# Patient Record
Sex: FEMALE | Race: WHITE | NOT HISPANIC OR LATINO | Employment: FULL TIME | URBAN - METROPOLITAN AREA
[De-identification: names, ages, dates, MRNs, and addresses within clinical notes are randomized per-mention and may not be internally consistent; named-entity substitution may affect disease eponyms.]

---

## 2023-12-27 ENCOUNTER — OFFICE VISIT (OUTPATIENT)
Dept: URGENT CARE | Facility: CLINIC | Age: 63
End: 2023-12-27

## 2023-12-27 VITALS
DIASTOLIC BLOOD PRESSURE: 91 MMHG | HEIGHT: 71 IN | WEIGHT: 180.75 LBS | OXYGEN SATURATION: 100 % | RESPIRATION RATE: 20 BRPM | HEART RATE: 78 BPM | BODY MASS INDEX: 25.31 KG/M2 | TEMPERATURE: 99 F | SYSTOLIC BLOOD PRESSURE: 161 MMHG

## 2023-12-27 DIAGNOSIS — I10 HYPERTENSION, UNSPECIFIED TYPE: ICD-10-CM

## 2023-12-27 DIAGNOSIS — N30.90 CYSTITIS: ICD-10-CM

## 2023-12-27 DIAGNOSIS — R39.15 URGENCY OF URINATION: Primary | ICD-10-CM

## 2023-12-27 LAB
BILIRUB UR QL STRIP: NEGATIVE
GLUCOSE UR QL STRIP: NEGATIVE
KETONES UR QL STRIP: NEGATIVE
LEUKOCYTE ESTERASE UR QL STRIP: NEGATIVE
PH, POC UA: 7 (ref 5–8)
POC BLOOD, URINE: POSITIVE
POC NITRATES, URINE: NEGATIVE
PROT UR QL STRIP: POSITIVE
SP GR UR STRIP: 1.01 (ref 1–1.03)
UROBILINOGEN UR STRIP-ACNC: NORMAL (ref 0.1–1.1)

## 2023-12-27 PROCEDURE — 81003 URINALYSIS AUTO W/O SCOPE: CPT | Mod: QW,S$GLB,, | Performed by: INTERNAL MEDICINE

## 2023-12-27 PROCEDURE — 99204 OFFICE O/P NEW MOD 45 MIN: CPT | Mod: S$GLB,,, | Performed by: INTERNAL MEDICINE

## 2023-12-27 RX ORDER — ENALAPRIL MALEATE 5 MG/1
5 TABLET ORAL DAILY
Qty: 90 TABLET | Refills: 3 | Status: SHIPPED | OUTPATIENT
Start: 2023-12-27 | End: 2024-12-26

## 2023-12-27 RX ORDER — NITROFURANTOIN 25; 75 MG/1; MG/1
100 CAPSULE ORAL 2 TIMES DAILY
Qty: 10 CAPSULE | Refills: 0 | Status: SHIPPED | OUTPATIENT
Start: 2023-12-27 | End: 2024-01-01

## 2023-12-27 NOTE — PROGRESS NOTES
"Subjective:      Patient ID: Cortney Strickland is a 63 y.o. female.    Vitals:  height is 5' 10.87" (1.8 m) and weight is 82 kg (180 lb 12.4 oz). Her temperature is 98.6 °F (37 °C). Her blood pressure is 161/91 (abnormal) and her pulse is 78. Her respiration is 20 and oxygen saturation is 100%.     Chief Complaint: Urinary Frequency    Urinary Frequency   This is a new problem. The current episode started yesterday. The problem occurs every urination. The problem has been gradually worsening. The quality of the pain is described as burning. The pain is at a severity of 8/10. The pain is severe. There has been no fever. She is Sexually active. Associated symptoms include frequency, hematuria and urgency. Pertinent negatives include no behavior changes, chills, discharge, flank pain, hesitancy, nausea, possible pregnancy, sweats, vomiting, weight loss, bubble bath use, constipation, rash or withholding. She has tried increased fluids for the symptoms. The treatment provided no relief. There is no history of catheterization, diabetes insipidus, diabetes mellitus, genitourinary reflux, hypertension, kidney stones, recurrent UTIs, a single kidney, STD, urinary stasis or a urological procedure.       Constitution: Negative for chills.   Gastrointestinal:  Negative for nausea, vomiting and constipation.   Genitourinary:  Positive for frequency, urgency and hematuria. Negative for flank pain.   Skin:  Negative for rash.      Objective:     Physical Exam   Constitutional: She is oriented to person, place, and time.  Non-toxic appearance. She does not appear ill. No distress.   HENT:   Head: Normocephalic and atraumatic.   Ears:   Right Ear: External ear normal.   Left Ear: External ear normal.   Nose: Nose normal. No congestion.   Pulmonary/Chest: No respiratory distress.   Abdominal: Normal appearance. There is no guarding, no left CVA tenderness and no right CVA tenderness.   Neurological: She is alert and oriented to " person, place, and time.   Skin: Skin is warm, dry and not diaphoretic.   Psychiatric: Her behavior is normal. Mood, judgment and thought content normal.   Vitals reviewed.      Assessment:     1. Urgency of urination    2. Cystitis    3. Hypertension, unspecified type        Plan:       Urgency of urination  -     POCT Urinalysis, Dipstick, Automated, W/O Scope    Cystitis  -     nitrofurantoin, macrocrystal-monohydrate, (MACROBID) 100 MG capsule; Take 1 capsule (100 mg total) by mouth 2 (two) times daily. for 5 days  Dispense: 10 capsule; Refill: 0    Hypertension, unspecified type  -     enalapril (VASOTEC) 5 MG tablet; Take 1 tablet (5 mg total) by mouth once daily.  Dispense: 90 tablet; Refill: 3